# Patient Record
Sex: FEMALE | Race: ASIAN | NOT HISPANIC OR LATINO | Employment: UNEMPLOYED | URBAN - METROPOLITAN AREA
[De-identification: names, ages, dates, MRNs, and addresses within clinical notes are randomized per-mention and may not be internally consistent; named-entity substitution may affect disease eponyms.]

---

## 2024-07-23 ENCOUNTER — APPOINTMENT (OUTPATIENT)
Dept: PRIMARY CARE | Facility: CLINIC | Age: 44
End: 2024-07-23
Payer: COMMERCIAL

## 2024-08-06 ENCOUNTER — LAB (OUTPATIENT)
Dept: LAB | Facility: LAB | Age: 44
End: 2024-08-06

## 2024-08-06 LAB — COTININE UR QL SCN: NEGATIVE

## 2024-09-17 ENCOUNTER — APPOINTMENT (OUTPATIENT)
Dept: PRIMARY CARE | Facility: CLINIC | Age: 44
End: 2024-09-17
Payer: COMMERCIAL

## 2024-09-17 VITALS
TEMPERATURE: 97.5 F | BODY MASS INDEX: 33.43 KG/M2 | DIASTOLIC BLOOD PRESSURE: 87 MMHG | WEIGHT: 195.8 LBS | HEIGHT: 64 IN | HEART RATE: 103 BPM | SYSTOLIC BLOOD PRESSURE: 124 MMHG

## 2024-09-17 DIAGNOSIS — Z76.89 ENCOUNTER TO ESTABLISH CARE: Primary | ICD-10-CM

## 2024-09-17 DIAGNOSIS — E55.9 VITAMIN D DEFICIENCY: ICD-10-CM

## 2024-09-17 DIAGNOSIS — Z12.39 BREAST SCREENING: ICD-10-CM

## 2024-09-17 DIAGNOSIS — F41.9 ANXIETY: ICD-10-CM

## 2024-09-17 DIAGNOSIS — G47.09 OTHER INSOMNIA: ICD-10-CM

## 2024-09-17 PROCEDURE — 3008F BODY MASS INDEX DOCD: CPT | Performed by: INTERNAL MEDICINE

## 2024-09-17 PROCEDURE — 1036F TOBACCO NON-USER: CPT | Performed by: INTERNAL MEDICINE

## 2024-09-17 PROCEDURE — 99214 OFFICE O/P EST MOD 30 MIN: CPT | Performed by: INTERNAL MEDICINE

## 2024-09-17 RX ORDER — DULOXETIN HYDROCHLORIDE 30 MG/1
30 CAPSULE, DELAYED RELEASE ORAL DAILY
COMMUNITY

## 2024-09-17 ASSESSMENT — ENCOUNTER SYMPTOMS
ABDOMINAL PAIN: 0
FLANK PAIN: 0
SHORTNESS OF BREATH: 0
WEAKNESS: 0
DIFFICULTY URINATING: 0
COUGH: 0
DIZZINESS: 0
DYSURIA: 0
CHILLS: 0
DECREASED CONCENTRATION: 0
WHEEZING: 0
FREQUENCY: 0
HEADACHES: 0
SORE THROAT: 0
NERVOUS/ANXIOUS: 0
CHEST TIGHTNESS: 0
BLOOD IN STOOL: 0
FATIGUE: 1
NECK PAIN: 0
ARTHRALGIAS: 0
BACK PAIN: 0
LIGHT-HEADEDNESS: 0
ACTIVITY CHANGE: 0
SLEEP DISTURBANCE: 1
UNEXPECTED WEIGHT CHANGE: 0
APPETITE CHANGE: 0
PALPITATIONS: 0

## 2024-09-17 NOTE — PROGRESS NOTES
"Subjective   Patient ID: Luz Gaming is a 44 y.o. female who presents for New Patient Visit (Pt present today to [facility]. ).    HPI patient is seen today for establishing care.  Her medical history is significant for anxiety disorder, insomnia.  She is currently taking duloxetine.  She follows with psychiatrist Dr. Brennan Womack.  She has tried several over-the-counter options for insomnia.  She has difficulty sleeping at night.  She has appointment made with sleep specialist.  No snoring as per .  She is also concerned about recent weight gain of 20 pounds.  She is also noted swelling in her ankles.  She exercises 20 minutes a day.  Diet-high in carbs  Review of Systems   Constitutional:  Positive for fatigue. Negative for activity change, appetite change, chills and unexpected weight change.   HENT:  Negative for congestion, postnasal drip and sore throat.    Eyes:  Negative for visual disturbance.   Respiratory:  Negative for cough, chest tightness, shortness of breath and wheezing.    Cardiovascular:  Positive for leg swelling. Negative for chest pain and palpitations.   Gastrointestinal:  Negative for abdominal pain and blood in stool.   Genitourinary:  Negative for difficulty urinating, dysuria, flank pain and frequency.   Musculoskeletal:  Negative for arthralgias, back pain, gait problem and neck pain.   Skin:  Negative for rash.   Neurological:  Negative for dizziness, weakness, light-headedness and headaches.   Psychiatric/Behavioral:  Positive for sleep disturbance. Negative for decreased concentration. The patient is not nervous/anxious.        Objective   /87 (BP Location: Left arm)   Pulse 103   Temp 36.4 °C (97.5 °F)   Ht 1.626 m (5' 4\")   Wt 88.8 kg (195 lb 12.8 oz)   BMI 33.61 kg/m²     Physical Exam  Vitals reviewed.   Constitutional:       General: She is not in acute distress.     Appearance: Normal appearance.   HENT:      Head: Normocephalic and atraumatic.      Right Ear: " Tympanic membrane and ear canal normal.      Left Ear: Tympanic membrane and ear canal normal.      Mouth/Throat:      Mouth: Mucous membranes are moist.   Eyes:      Extraocular Movements: Extraocular movements intact.      Conjunctiva/sclera: Conjunctivae normal.      Pupils: Pupils are equal, round, and reactive to light.   Cardiovascular:      Rate and Rhythm: Normal rate and regular rhythm.      Pulses: Normal pulses.   Pulmonary:      Effort: Pulmonary effort is normal. No respiratory distress.      Breath sounds: Normal breath sounds.   Abdominal:      General: Bowel sounds are normal. There is no distension.      Tenderness: There is no abdominal tenderness.   Musculoskeletal:         General: No swelling or tenderness. Normal range of motion.      Cervical back: Normal range of motion.   Skin:     General: Skin is warm.   Neurological:      General: No focal deficit present.      Mental Status: She is alert.      Coordination: Coordination normal.      Gait: Gait normal.   Psychiatric:         Mood and Affect: Mood normal.         Behavior: Behavior normal.         Assessment/Plan   Diagnoses and all orders for this visit:  Encounter to establish care  Comments:  Establishing care  Blood work ordered  Orders:  -     CBC; Future  -     Comprehensive Metabolic Panel; Future  -     Hemoglobin A1C; Future  -     Lipid Panel; Future  -     TSH with reflex to Free T4 if abnormal; Future  -     T4, free; Future  Anxiety  Comments:  Continue with duloxetine  Follow-up with psychiatrist  Vitamin D deficiency  Comments:  Check vitamin D levels  Orders:  -     Vitamin D 25-Hydroxy,Total (for eval of Vitamin D levels); Future  Breast screening  Comments:  Screening mammogram ordered  Orders:  -     BI mammo bilateral screening tomosynthesis; Future  Other insomnia  Comments:  Follow-up with sleep specialist    Encouraged her to eat healthy, include more protein, fiber in the diet  Increase exercise  Encouraged her to  see nutritionist  Follow-up in 6 months or sooner

## 2024-09-18 ENCOUNTER — LAB (OUTPATIENT)
Dept: LAB | Facility: LAB | Age: 44
End: 2024-09-18
Payer: COMMERCIAL

## 2024-09-18 DIAGNOSIS — E55.9 VITAMIN D DEFICIENCY: ICD-10-CM

## 2024-09-18 DIAGNOSIS — Z76.89 ENCOUNTER TO ESTABLISH CARE: ICD-10-CM

## 2024-09-18 LAB
25(OH)D3 SERPL-MCNC: 20 NG/ML (ref 30–100)
ALBUMIN SERPL BCP-MCNC: 4.2 G/DL (ref 3.4–5)
ALP SERPL-CCNC: 99 U/L (ref 33–110)
ALT SERPL W P-5'-P-CCNC: 56 U/L (ref 7–45)
ANION GAP SERPL CALC-SCNC: 15 MMOL/L (ref 10–20)
AST SERPL W P-5'-P-CCNC: 44 U/L (ref 9–39)
BILIRUB SERPL-MCNC: 0.4 MG/DL (ref 0–1.2)
BUN SERPL-MCNC: 11 MG/DL (ref 6–23)
CALCIUM SERPL-MCNC: 9.5 MG/DL (ref 8.6–10.6)
CHLORIDE SERPL-SCNC: 100 MMOL/L (ref 98–107)
CHOLEST SERPL-MCNC: 154 MG/DL (ref 0–199)
CHOLESTEROL/HDL RATIO: 4.4
CO2 SERPL-SCNC: 27 MMOL/L (ref 21–32)
CREAT SERPL-MCNC: 0.67 MG/DL (ref 0.5–1.05)
EGFRCR SERPLBLD CKD-EPI 2021: >90 ML/MIN/1.73M*2
ERYTHROCYTE [DISTWIDTH] IN BLOOD BY AUTOMATED COUNT: 14.3 % (ref 11.5–14.5)
EST. AVERAGE GLUCOSE BLD GHB EST-MCNC: 137 MG/DL
GLUCOSE SERPL-MCNC: 97 MG/DL (ref 74–99)
HBA1C MFR BLD: 6.4 %
HCT VFR BLD AUTO: 39.8 % (ref 36–46)
HDLC SERPL-MCNC: 35.3 MG/DL
HGB BLD-MCNC: 12.1 G/DL (ref 12–16)
LDLC SERPL CALC-MCNC: 43 MG/DL
MCH RBC QN AUTO: 23.3 PG (ref 26–34)
MCHC RBC AUTO-ENTMCNC: 30.4 G/DL (ref 32–36)
MCV RBC AUTO: 77 FL (ref 80–100)
NON HDL CHOLESTEROL: 119 MG/DL (ref 0–149)
NRBC BLD-RTO: 0 /100 WBCS (ref 0–0)
PLATELET # BLD AUTO: 348 X10*3/UL (ref 150–450)
POTASSIUM SERPL-SCNC: 4.5 MMOL/L (ref 3.5–5.3)
PROT SERPL-MCNC: 7.4 G/DL (ref 6.4–8.2)
RBC # BLD AUTO: 5.19 X10*6/UL (ref 4–5.2)
SODIUM SERPL-SCNC: 137 MMOL/L (ref 136–145)
T4 FREE SERPL-MCNC: 1.06 NG/DL (ref 0.78–1.48)
TRIGL SERPL-MCNC: 377 MG/DL (ref 0–149)
TSH SERPL-ACNC: 0.95 MIU/L (ref 0.44–3.98)
VLDL: 75 MG/DL (ref 0–40)
WBC # BLD AUTO: 10 X10*3/UL (ref 4.4–11.3)

## 2024-09-18 PROCEDURE — 83036 HEMOGLOBIN GLYCOSYLATED A1C: CPT

## 2024-09-18 PROCEDURE — 85027 COMPLETE CBC AUTOMATED: CPT

## 2024-09-18 PROCEDURE — 36415 COLL VENOUS BLD VENIPUNCTURE: CPT

## 2024-09-18 PROCEDURE — 80053 COMPREHEN METABOLIC PANEL: CPT

## 2024-09-18 PROCEDURE — 84443 ASSAY THYROID STIM HORMONE: CPT

## 2024-09-18 PROCEDURE — 82306 VITAMIN D 25 HYDROXY: CPT

## 2024-09-18 PROCEDURE — 80061 LIPID PANEL: CPT

## 2024-09-18 PROCEDURE — 84439 ASSAY OF FREE THYROXINE: CPT

## 2024-09-20 DIAGNOSIS — E55.9 VITAMIN D DEFICIENCY: Primary | ICD-10-CM

## 2024-09-20 RX ORDER — ERGOCALCIFEROL 1.25 MG/1
50000 CAPSULE ORAL WEEKLY
Qty: 12 CAPSULE | Refills: 1 | Status: SHIPPED | OUTPATIENT
Start: 2024-09-20 | End: 2024-12-13

## 2024-09-30 NOTE — PROGRESS NOTES
Patient: Luz Gaming    [id number]  : 1980 -- AGE 44 y.o.    Provider: Ayo Clinton MD PhD     Location [facility]   Service Date: 10/1/2024              [facility]  New Visit Note        HISTORY OF PRESENT ILLNESS     The patient's referring provider is: Lida Acosta MD     REASON FOR VISIT:   Chief Complaint   Patient presents with    Pt. here today for NPV    Sleeping Problem        HISTORY OF PRESENT ILLNESS   Ms. Luz Gaming is a 44 y.o. female with past medical history of anxiety and insomnia who presents to a [facility] for a sleep medicine evaluation with concerns of insomnia.      CURRENT HISTORY  Patient had established care with her PCP and has had issues of insomnia. She has tried some OTC without success. She currently takes duloxetine.    She comes to this visit with her  who is a hospitalist here at .  On today's visit, the patient reports she has issues falling asleep, going back about 10 years ago or more when her son was small. She feels the issues have been getting worse. She has gained some weight. She has been taking melatonin at 7:30pm and atarax. She also takes duloxetine for anxiety.    Her  has noted that sometimes during the night that she will yell out but will not have any significant body movements associated with it.  She has gained some weight over the last few years.  She is currently trying to lose that weight.  With her son now being in college she is focusing on helping her niece who is [place] to remote interactions with her schooling.      Sleep schedule:  In bed: 10:30PM (reads in living room)  Activities in bed:   Bedtime Activities: turns out the lights  Subjective sleep latency:  15-20 minutes some days; 2-3x/week > 1h  Awakenings during night: 0-1x for BR  Length of awakenings: can fall back asleep < 15 minutes  Final awakening time: 6AM  "(sometimes awake before alarm)  Out of bed: 6AM  Overall estimate of total sleep time: 6h    Weekends: -  Preferred sleeping position: sidelying  Typically sleeps with her .     Associated sleep symptoms:  Breathing during sleep: snoring some days; ; no witnessed apneas;   Behaviors at night: husbabd noted when first  she would shout at night  Sleep paralysis: No   Hypnogogic or hypnopompic hallucinations: No   Cataplexy: No     Leg symptoms and timing:  - Sensations: Patient does not have unusual sensations in their extremities that cause an urge to move them       Sleep environment:  Pets in bed :  No  Bedroom temperature: WNL  Noise :   No\"   Issues with bed comfort :   No     Daytime Symptoms:  On awakening patient reports: waking refreshed and morning dry mouth; except on nights that she gets less sleep then she is tired.    Daytime: During the day, she does not doze unintentionally while inactive.   She might feel sleepy after lunch, During more active tasks, she never is drowsy.  With regards to daytime napping, the patient reports never taking naps. If she takes a nap, typically she awakens refreshed.  She does not report that poor sleep results in mood-related irritability. She does not report that poor sleep results in issues with memory/concentration.    Driving History: With driving, the patient denies sleepy driving, with 0 sleepiness-related motor vehicle accidents and 0 near misses.      ESS: -  RED: -  FOSQ:  -      REVIEW OF SYSTEMS     REVIEW OF SYSTEMS  Review of Systems   All other systems reviewed and are negative.      ALLERGIES AND MEDICATIONS     ALLERGIES  No Known Allergies    MEDICATIONS  Current Outpatient Medications   Medication Sig Dispense Refill    DULoxetine (Cymbalta) 30 mg DR capsule Take 1 capsule (30 mg) by mouth once daily. Do not crush or chew.      ergocalciferol (Vitamin D-2) 1.25 MG (41206 UT) capsule Take 1 capsule (50,000 Units) by mouth 1 (one) time per " week. 12 capsule 1    hydrOXYzine pamoate (Vistaril) 25 mg capsule Take 1 capsule (25 mg) by mouth as needed at bedtime.       No current facility-administered medications for this visit.       PAST HISTORY     PAST MEDICAL HISTORY  She  has a past medical history of Other specified health status.    PAST SURGICAL HISTORY:  Past Surgical History:   Procedure Laterality Date    OTHER SURGICAL HISTORY  06/17/2022    No history of surgery       FAMILY HISTORY  Family History   Problem Relation Name Age of Onset    Hypertension Mother      Diabetes Mother      Hypertension Maternal Grandmother      Diabetes Maternal Grandmother      Breast cancer Maternal Grandmother       She does not have a family history of sleep disorder (e.g., sleep apnea, narcolepsy in any first degree relatives).      SOCIAL HISTORY  She  reports that she has never smoked. She has never used smokeless tobacco. She reports that she does not drink alcohol and does not use drugs. She currently lives with her . She is a homemaker. Her son is in college.       Caffeine consumption: No  Alcohol consumption: No  Smoking: No  Marijuana: No      PHYSICAL EXAM     Physical Examination: /84   Pulse (!) 111   Temp 36.4 °C (97.5 °F)   Wt 86.2 kg (190 lb)   SpO2 97% Comment: RA  BMI 32.61 kg/m²     PREVIOUS WEIGHTS:  Wt Readings from Last 3 Encounters:   10/01/24 86.2 kg (190 lb)   09/17/24 88.8 kg (195 lb 12.8 oz)   06/17/22 78 kg (172 lb)       General: The patient is a pleasant female, in no acute distress. HEENT: She has a  a modified Mallampati grade 3 airway with Numbers; 0-4 (with +): 1+ tonsils bilaterally. The soft palate was symmetric  and the uvula was normal. The A/P diameter of the velopharynx was narrowed. The oropharynx was not shallow in the A/P diameter. Lateral wall narrowing was not present. Tongue ridging was not present. Erythema of the posterior pharynx was not present. Mucosal hypertrophy in the posterior oropharynx was  "not present. Retrognathia was not and micrognathia was not present. Neck: The neck was not enlarged. No JVP, bruits or lymphadenopathy was appreciated. Chest: Clear to auscultation. No wheezes, rales, or rhonchi. Cardiovascular: Regular rate and rhythm. No murmurs, gallops, or clicks. Abdomen: Soft, nontender, nondistended. Positive bowel sounds. Extremities: No clubbing, cyanosis, or edema is noted. Neurologic exam: Alert, oriented x3 and was grossly non-focal.      RESULTS/DATA     No results found for: \"IRON\", \"TRANSFERRIN\", \"IRONSAT\", \"TIBC\", \"FERRITIN\"    Bicarbonate (mmol/L)   Date Value   09/18/2024 27   01/15/2020 28         DIAGNOSES     Problem List and Orders  Diagnoses and all orders for this visit:  Chronic insomnia  -     Follow Up In Adult Sleep Medicine; Future        ASSESSMENT/PLAN     Ms. Gaming is a 44 y.o. female and with past medical history of anxiety and insomnia. She presents to  the [facility] to address her chronic insomnia.      Chronic insomnia.  At this time the patient primarily has symptoms that are most related to sleep onset insomnia.  When she falls asleep she generally feels that she can sleep through the night.  She is affected by this about 2-3 times a week where it takes her more than 60 minutes to be able to fall asleep.  On those nights is when she might feel more tired but is still able to function and get through the day.  She is already taking melatonin, duloxetine, and Atarax to help with her anxiety and her sleep-related anxiety.  She has seen a psychologist previously but not necessarily makes insomnia specific context.    We discussed treatment options which can include the use of medications versus cognitive behavioral therapy.  They are most interested in avoiding medications just to avoid issues of long-term dependency.  At this time she does not want to engage in CBT given her past experience with a psychologist but she may be open " to reconsidering it in the future.    For now we have given her reassurance about basic techniques to help her with falling asleep at night.  This includes basic stimulus control techniques and other good sleep tips.    We will arrange for follow-up in 6 months as a check-in and she knows that she can call sooner if she feels that her insomnia symptoms are worsening.    My pretest probability for sleep apnea is relatively low given that most of her problems are with sleep onset rather than with sleep maintenance.  We can always revisit the need for sleep study at some point.  She has had some other over-the-counter screening device that she is used which was negative from the perspective of sleep apnea.  Some intermittent snoring is noted more in the early morning hours, suggesting that there might be a component of REM related sleep apnea.    Follow up: 6 month         Ayo Clinton MD PhD

## 2024-10-01 ENCOUNTER — OFFICE VISIT (OUTPATIENT)
Dept: SLEEP MEDICINE | Facility: HOSPITAL | Age: 44
End: 2024-10-01
Payer: COMMERCIAL

## 2024-10-01 VITALS
HEART RATE: 111 BPM | BODY MASS INDEX: 32.61 KG/M2 | DIASTOLIC BLOOD PRESSURE: 84 MMHG | TEMPERATURE: 97.5 F | WEIGHT: 190 LBS | SYSTOLIC BLOOD PRESSURE: 118 MMHG | OXYGEN SATURATION: 97 %

## 2024-10-01 DIAGNOSIS — F51.04 CHRONIC INSOMNIA: Primary | ICD-10-CM

## 2024-10-01 PROCEDURE — 99204 OFFICE O/P NEW MOD 45 MIN: CPT | Performed by: INTERNAL MEDICINE

## 2024-10-01 PROCEDURE — 1036F TOBACCO NON-USER: CPT | Performed by: INTERNAL MEDICINE

## 2024-10-01 PROCEDURE — 99214 OFFICE O/P EST MOD 30 MIN: CPT | Performed by: INTERNAL MEDICINE

## 2024-10-01 RX ORDER — HYDROXYZINE PAMOATE 25 MG/1
25 CAPSULE ORAL NIGHTLY PRN
COMMUNITY
Start: 2024-08-15

## 2024-10-01 ASSESSMENT — PAIN SCALES - GENERAL: PAINLEVEL: 0-NO PAIN

## 2024-10-01 ASSESSMENT — LIFESTYLE VARIABLES
HOW OFTEN DO YOU HAVE SIX OR MORE DRINKS ON ONE OCCASION: NEVER
HOW MANY STANDARD DRINKS CONTAINING ALCOHOL DO YOU HAVE ON A TYPICAL DAY: PATIENT DOES NOT DRINK
HOW OFTEN DO YOU HAVE A DRINK CONTAINING ALCOHOL: NEVER
SKIP TO QUESTIONS 9-10: 1
AUDIT-C TOTAL SCORE: 0

## 2024-10-01 ASSESSMENT — PATIENT HEALTH QUESTIONNAIRE - PHQ9
SUM OF ALL RESPONSES TO PHQ9 QUESTIONS 1 & 2: 0
1. LITTLE INTEREST OR PLEASURE IN DOING THINGS: NOT AT ALL
2. FEELING DOWN, DEPRESSED OR HOPELESS: NOT AT ALL

## 2024-10-01 NOTE — PATIENT INSTRUCTIONS
[facility] Sleep Medicine  [facility] [place]  [address]  [facility] [address]  [phone]    [facility] [place]  [address]  [facility] [address]  [phone]  [facility] [place]  [address]  [facility]  [facility] [address]           NAME: Luz Gaming   DATE: 10/1/2024     Your Sleep Provider Today: Ayo Clinton MD PhD  Your Primary Care Physician: Lida Acosta MD   Your Referring Provider: No ref. provider found    Thank you for coming to the Sleep Medicine Clinic today! Your sleep medicine provider today was: Ayo Clinton MD PhD Below is a summary of your treatment plan, other important information, and our contact numbers:  If you need to schedule an appointment, please call [phone])  If you need general assistance (e.g. forms completed, general questions), please call my , Suzy, at [phone].  If you have a medical question about your sleep issues, please contact our nurses, Mary or Reva at [phone].   You can also contact us through [employer].      DIAGNOSIS:   1. Chronic insomnia  CANCELED: Referral to Adult Behavioral Sleep Medicine    CANCELED: Referral to Adult Behavioral Sleep Medicine              TREATMENT PLAN           SLEEP TIPS  13 Simple Tips to Improve Your Sleep     TO DO:               Establish a relaxing bedtime routine, 30-60 minutes long, away from bright lights. Stop working on tasks during this time. The goal is to give your brain a chance to wind down.     Maintain a comfortable sleeping environment. Your bedroom should be cool, dark, quiet, and comfortable. Use your bed only for sleep and intimacy to strengthen the association between your bedroom and sleep. Try to remove anything from your bedroom that is distracting (e.g., computers, televisions). Consider using blackout  "curtains/eye shades, earplugs, fans, and \"white noise\" machines to minimize disturbances. Avoid watching television, playing video games, or using your computer in bed. The lights and sounds from these devices can be stimulating and interfere with your ability to sleep.     Schedule your sleep and make it a priority. Fix your bedtime and rise time, even on weekends, as it can help set your internal clock.     Go to sleep only when you're tired. If you are having a hard time falling asleep within 20 minutes, then get out of bed and do something relaxing.      Get regular exposure to outdoor or bright lights upon awakening and throughout the day. The light can help align your internal clock with your sleep-wake schedule.     Exercise daily, at least 3-4h before bedtime, as this can help deepen sleep.     Try to have a time of day set aside for addressing your worries. If you find that you lay in bed and worry, write down your worries and any plans/times to address them.     Learn a relaxation technique, such as meditation or progressive muscle relaxation, and practice it in bed.     TO AVOID:              Avoid napping in the late afternoon or evening. While naps can be refreshing, late afternoon naps can decrease your sleep drive and make it more difficult for you to fall asleep later. If you are extremely tired and must nap, nap for 20-30 minutes and set an alarm clock so that you don't oversleep.     Avoid checking the clock in the middle of the night.     Avoid stimulants before bedtime, as they can keep you awake:    Caffeine - consume no more than three cups of caffeinated beverages per day, none after lunch.     Nicotine - consider quitting, or at least cutting back on your nicotine consumption. High levels of nicotine at bedtime can interfere with your ability to sleep deeply. Low nicotine levels in the morning can cause withdrawal symptoms and contribute to sleep problems as well.     Avoid alcohol several " hours before bedtime. While alcohol can make you drowsy, it can cause you to awaken during the night.     Avoid going to bed too hungry or too full. If you are hungry, a light snack or a glass of milk would be a good choice.       Ideas for Relaxation:            Take a warm bath   Listen to music   Meditate or pray   Read   Perform relaxation or stretching exercises   Browse through magazines   Create a list of things you might enjoy doing on the weekend   Declutter your desk   Pick out your clothes for the next day   Make your lunch for the next day       LEARN MORE ABOUT INSOMNIA:          There are a number of books written about insomnia and meditation. We recommend that you speak with your  or search the Internet for literature, as there is no way for us to provide a comprehensive and up-to-date list. Here are several popular titles:     Effortless Sleep  by Charlotte Gonzalez     Say Good Night to Insomnia: The Six-Week Drug-Free Program Developed at [employer] by Sean Aguilar     The Insomnia Workbook: A Comprehensive Guide to Getting the Sleep You Need by Henrietta Ventura     10% Happier: How I Tamed the Voice in My Head, Reduced Stress Without Losing My Edge, and Found Self-Help That Actually Works by Iglesia Willett       SEEK HELP:              If you have insomnia symptoms lasting for more than four weeks, then you should speak with your family physician or sleep physician about getting a referral to a psychologist who specializes in Cognitive Behavioral Therapy for Insomnia (CBT-I). CBT-I involves multiple techniques to improve your sleep, including ensuring good sleep hygiene and stimulus control, providing relaxation exercises, ensuring appropriate sleep restriction, and performing cognitive restructuring.     Follow-up Appointment:   Followup with me in 6 months.      IMPORTANT INFORMATION     Call 911 for medical emergencies.  Our offices are generally open from  Monday-Friday, 9 am - 5 pm.  If you need to get in touch with me, you may either call me and my team(number is below) or you can use [employer].  If a referral for a test, for CPAP, or for another specialist was made, and you have not heard about scheduling this within a week, please call scheduling at [phone] ([phone]).  If you are unable to make your appointment for clinic or an overnight study, kindly call the office at least 48 hours in advance to cancel and reschedule.  If you are on CPAP, please bring your device's card or the device to each clinic appointment.   There are no supporting services by either the sleep doctors or their staff on weekends and Holidays, or after 5 PM on weekdays.   If you have been asked to come to a sleep study, make sure you bring toiletries, a comfy pillow, and any nighttime medications that you may regularly take. Also be sure to eat dinner before you arrive. We generally do not provide meals.      PRESCRIPTIONS     We require 7 days advanced notice for prescription refills. If we do not receive the request in this time, we cannot guarantee that your medication will be refilled in time.      IMPORTANT PHONE NUMBERS      scheduling for medical testin004 - [phone]   Sleep Medicine Clinic Fax: [phone]  Appointments (for Pediatric Sleep Clinic): [phone] - option 1  Appointments (for Adult Sleep Clinic): [phone] - option 2  Appointments (For Sleep Studies): [phone] - option 3  Behavioral Sleep Medicine: [phone]  Bariatric Surgery: [phone] ( Bariatric Surgery Website)   Sleep Surgery: [phone]  ENT (Otolaryngology): [phone]  Myofunctional Therapy (ENT): KELSY Ragland, Yefri Vidal, Doc Barrios; [phone]   Ino Reyes; [phone]  Franko Singh; [phone]  [facility] [place]; [phone]  Blossom Tolbert/[facility] [phone] (option 1)  Headache Clinic (Neurology):  [phone]  Neurology: [phone]  Psychiatry: [phone]  Pulmonary Function Testing (PFT) Center: [phone]  Pulmonary Medicine: [phone]  [employer] (DME): [phone]  [employer] (DME): [phone]  [facility] ([facility] [phone]      COMMON PROVIDERS WE REFER TO     For Weight Loss - Dr. Wes Sigala - Call [phone]  For Sleep Surgery - Dr. David Lindsay - Call [phone]      OUR ADULT SLEEP MEDICINE TEAM   Please do not hesitate to call the office or sleep nurse with any questions between appointments:    Adult Sleep Nurses (Reva Valdez, RN and Mary Barrios RN):  For clinical questions and refilling prescriptions: [phone]  Email sleep diaries and other documents at: [email or web address]    Adult Sleep Medicine Secretaries:  Maricarmen Carrion (For Alisha/Soriano/Krlaura/Jazmine/Farzaneh/Haile):   P: [phone]  F: [phone]  Suzy Kwok (For Clinton/Guggenjaimeler): P: [phone]  Fax: [phone]  Jocelyne Rosales (For Anita/Halima): P: [phone]  F: [phone]  Monae Bustamante (For Deyvi): P: [phone]  F: [phone]  Noris Borges (For Yisel/Susana/Zathao): P: [phone]  F: [phone]  Pamella Sky (For Meredith/Yash): P: [phone]  F: [phone]     Adult Sleep Medicine Advanced Practice Providers:  Herve Varghese ([place], [place])  Clara Meza ([facility])  Nakia Jackson CNP ([place], [place], [facility])  Gela Case CNP ([place], [place], [facility])  Nhi Carnes ([place] [place])  Darek Berrios CNP ([facility])        OUR SLEEP TESTING LOCATIONS     Our team will contact you to schedule your sleep study, however, you can contact us as follow:  Main Phone Line (scheduling only): [phone]), option 3  Adult and Pediatric Locations  [facility] (6 years and older): [facility] - 4th floor ([address],  "[facility]) After hours line: [phone]  [facility] (Main campus: All ages): [facility], 6th floor. After hours line: [phone]   [place] (5 years and older; younger considered on case-by-case basis): [address]; [address], [address]. Scheduling  After hours line: [phone]   [place] (6 years and older): [address]; [address]; [address]   [place] (6 years and older): [address] [facility] [address] [facility], [address]  After hours line: [phone]   [place] (13 years and older) in [place]: [address], 2nd floor  After hours line: [phone]   [place] (13 year and older): [address]  After hours line: [phone]     Adult Only Locations:   [place] (18 years and older): [address] [place] [address], 2nd floor   [place] (18 years and older): [address] [facility] [address] [facility]; 4th floor  After hours line: [phone]   [place] (18 years and older) at [place]: [id number] [place] [address]  After hours line: [phone]          CONTACTING YOUR SLEEP MEDICINE PROVIDER     Send a message directly to your provider through \"My Chart\", which is the email service through your  Records Account: [email or web address]   Call [phone] and leave a message. One of the administrative assistants will forward the message to your sleep medicine provider through \"My Chart\" and/or email.     Your sleep medicine provider for this visit was: Ayo Clinton MD PhD        "

## 2024-10-02 ENCOUNTER — APPOINTMENT (OUTPATIENT)
Dept: RADIOLOGY | Facility: CLINIC | Age: 44
End: 2024-10-02
Payer: COMMERCIAL

## 2024-10-17 ENCOUNTER — HOSPITAL ENCOUNTER (OUTPATIENT)
Dept: RADIOLOGY | Facility: CLINIC | Age: 44
Discharge: HOME | End: 2024-10-17
Payer: COMMERCIAL

## 2024-10-17 DIAGNOSIS — Z12.39 BREAST SCREENING: ICD-10-CM

## 2024-10-17 PROCEDURE — 77067 SCR MAMMO BI INCL CAD: CPT

## 2024-11-05 ENCOUNTER — APPOINTMENT (OUTPATIENT)
Dept: OBSTETRICS AND GYNECOLOGY | Facility: CLINIC | Age: 44
End: 2024-11-05
Payer: COMMERCIAL

## 2024-11-12 ENCOUNTER — APPOINTMENT (OUTPATIENT)
Dept: OBSTETRICS AND GYNECOLOGY | Facility: CLINIC | Age: 44
End: 2024-11-12
Payer: COMMERCIAL

## 2024-11-12 VITALS
SYSTOLIC BLOOD PRESSURE: 108 MMHG | WEIGHT: 183 LBS | HEIGHT: 64 IN | DIASTOLIC BLOOD PRESSURE: 70 MMHG | BODY MASS INDEX: 31.24 KG/M2

## 2024-11-12 DIAGNOSIS — N89.8 VAGINAL DISCHARGE: ICD-10-CM

## 2024-11-12 DIAGNOSIS — Z01.419 ENCOUNTER FOR GYNECOLOGICAL EXAMINATION WITHOUT ABNORMAL FINDING: Primary | ICD-10-CM

## 2024-11-12 PROCEDURE — 87491 CHLMYD TRACH DNA AMP PROBE: CPT

## 2024-11-12 PROCEDURE — 87624 HPV HI-RISK TYP POOLED RSLT: CPT

## 2024-11-12 PROCEDURE — 87661 TRICHOMONAS VAGINALIS AMPLIF: CPT

## 2024-11-12 PROCEDURE — 3008F BODY MASS INDEX DOCD: CPT | Performed by: OBSTETRICS & GYNECOLOGY

## 2024-11-12 PROCEDURE — 99386 PREV VISIT NEW AGE 40-64: CPT | Performed by: OBSTETRICS & GYNECOLOGY

## 2024-11-12 PROCEDURE — 87591 N.GONORRHOEAE DNA AMP PROB: CPT

## 2024-11-12 PROCEDURE — 88175 CYTOPATH C/V AUTO FLUID REDO: CPT

## 2024-11-12 PROCEDURE — 87205 SMEAR GRAM STAIN: CPT

## 2024-11-12 NOTE — PROGRESS NOTES
Subjective   Luz Gaming is a 44 y.o. female here for a routine exam.  She is a new patient to this practice.  She has cycles that are typically monthly.  They can be heavy for the first 3 days, no cramps.  There is occasional mild pelvic discomfort, no discharge, no dysuria change in bowel habits..  She has her partner's vasectomy for contraception.  She has a history of vaginal delivery 18 years ago, a son.  There is family history of breast cancer in a maternal grandmother in her 60s.    Personal health questionnaire reviewed: yes.     Gynecologic History  Patient's last menstrual period was 10/28/2024 (approximate).  Contraception: vasectomy  Last Pap: 13. Results were: normal  Last mammogram: 10/17/24. Results were: normal    Obstetric History  OB History    Para Term  AB Living   1 1 1         SAB IAB Ectopic Multiple Live Births                  # Outcome Date GA Lbr Jasen/2nd Weight Sex Type Anes PTL Lv   1 Term                Objective   Constitutional: Alert and in no acute distress. Well developed, well nourished.   Head and Face: Head and face: Normal.    Eyes: Normal external exam - nonicteric sclera, extraocular movements intact (EOMI) and no ptosis.   Neck: No neck asymmetry. Supple. Thyroid not enlarged and there were no palpable thyroid nodules.    Pulmonary: No respiratory distress.   Chest: Breasts: Normal appearance, no nipple discharge and no skin changes. Palpation of breasts and axillae: No palpable mass and no axillary lymphadenopathy.   Abdomen: Soft nontender; no abdominal mass palpated. No organomegaly. No hernias.   Genitourinary: External genitalia: Normal. No inguinal lymphadenopathy. Bartholin's Urethral and Skenes Glands: Normal. Urethra: Normal.  Bladder: Normal on palpation. Vagina: Normal. Cervix: Normal.  Uterus: Normal.  Right Adnexa/parametria: Normal.  Left Adnexa/parametria: Normal.  Inspection of Perianal Area: Normal.   Musculoskeletal: No joint swelling  seen, normal movements of all extremities.   Skin: Normal skin color and pigmentation, normal skin turgor, and no rash.   Neurologic: Non-focal. Grossly intact.   Psychiatric: Alert and oriented x 3. Affect normal to patient baseline. Mood: Appropriate.  Physical Exam     Assessment/Plan   Healthy female exam.  This is a 44-year-old female with a normal exam.  A Pap smear was sent, including cultures for chlamydia, gonorrhea, trichomonas, BV and yeast.  Her routine mammogram is due in October 2025.    I will see her routinely in 1 year.  Mammogram ordered.

## 2024-11-13 LAB
BACTERIAL VAGINOSIS VAG-IMP: NORMAL
C TRACH RRNA SPEC QL NAA+PROBE: NEGATIVE
CLUE CELLS VAG LPF-#/AREA: NORMAL /[LPF]
N GONORRHOEA DNA SPEC QL PROBE+SIG AMP: NEGATIVE
NUGENT SCORE: 4
T VAGINALIS RRNA SPEC QL NAA+PROBE: NEGATIVE
YEAST VAG WET PREP-#/AREA: NORMAL

## 2024-11-27 LAB
CYTOLOGY CMNT CVX/VAG CYTO-IMP: NORMAL
HPV HR 12 DNA GENITAL QL NAA+PROBE: NEGATIVE
HPV HR GENOTYPES PNL CVX NAA+PROBE: NEGATIVE
HPV16 DNA SPEC QL NAA+PROBE: NEGATIVE
HPV18 DNA SPEC QL NAA+PROBE: NEGATIVE
LAB AP HPV GENOTYPE QUESTION: YES
LAB AP HPV HR: NORMAL
LAB AP PAP ADDITIONAL TESTS: NORMAL
LABORATORY COMMENT REPORT: NORMAL
LMP START DATE: NORMAL
PATH REPORT.TOTAL CANCER: NORMAL

## 2025-02-13 ENCOUNTER — PHARMACY VISIT (OUTPATIENT)
Dept: PHARMACY | Facility: CLINIC | Age: 45
End: 2025-02-13
Payer: COMMERCIAL

## 2025-02-13 PROCEDURE — RXMED WILLOW AMBULATORY MEDICATION CHARGE

## 2025-02-13 RX ORDER — PROPRANOLOL HYDROCHLORIDE 20 MG/1
TABLET ORAL
Qty: 30 TABLET | Refills: 0 | OUTPATIENT
Start: 2025-02-13